# Patient Record
(demographics unavailable — no encounter records)

---

## 2025-05-26 NOTE — END OF VISIT
[FreeTextEntry3] : AMISHA is being assessed for diagnostic clarification of academic and social concerns.  Chief Concerns: - Academic underperformance - Symptoms of inattention and hyperactivity - Social communication difficulties - Executive functioning issues  Early developmental milestones: - Pregnancy was uneventful with no complications - Born via  due to history of older sibling being large - No early intervention referrals - Milestone attainment reported as good  Current developmental milestones: Language : - No early language delays reported - Expressive language appears grossly normal - Issues with comprehension at times - Unclear if comprehension issues are due to attention problems - Can give short answers if not interested in topic - Requires persistent probing by others to engage in conversation - Limited back-and-forth conversation - Topic-specific preferences in conversation - Tends to redirect conversations to preferred topics (e.g., Minecraft)  Socialization : - Has friends, no reported issues with early socialization - Described as having a 'weird' friend group - Appears to be a people-pleaser - Buys things for friends, potentially to excess  Play : - Enjoys Legos - Likes drawing cartoons - Interested in cars - Attempts to build things (e.g., flashlight with Legos)  Fine motor skills : - History of fine motor issues addressed by OT in schoolr  Gross motor skills : - No gross motor or coordination problems reported - Not described as particularly athletic or sporty  Repetitive/restrictive interests: - Gets fixated on certain topics (e.g., Minecraft) - Can redirect conversations back to preferred topics  Sensory difference: - Some food aversions based on texture - No other major sensory issues reported  Academics: - Academic concerns started in first or second grade - Evaluated by CSE due to fine motor issues - Classified as 'Other Health Impairment' and received an IEP - Services included OT (2-3 times per week) and counseling - Reported to have 'high IQ' on initial testing - Declassified after three years of services - Current academic performance below expectations - Struggles with task completion and meeting deadlines - Difficulties with cognitive flexibility and applying rules to new situations  Inattention/hyperactivity: - Symptoms of inattention and hyperactivity present - Forgetful of items (e.g., school bag, shoes) - Often loses items, including expensive electronics - Struggles with preparation and organization - Easily distracted during classroom activities - Difficulty meeting deadlines - Requires someone to sit next to him for homework direction  Medications: - No current medications reported  Anxiety: - Suspected internalization of anxiety - Increased fidgetiness noted recently - Appears affected by academic performance and peer comparisons  General: Diet : - Some pickiness reported - Prefers bread-based foods - No weight loss or gain issues noted  Sleep : - No significant sleep problems reported - Described as restless but not snoring  Exercise/physical use/hobbies: - Plays soccer - Takes trombone lessons - Not particularly athletic or sporty  Medical problems: - Seizures : ? - Previous Workup : CSE evaluation in early elementary school  - Allergies : - History of allergies to eggs, tree nuts, and peanuts (reportedly improved)  - Nathanael strengths : Enjoys drawing cartoons, building with Legos, hands-on activities Previous Evaluations: - CSE evaluation in early elementary school, resulting in IEP classification. Triennial evaluation leading to declassification.   Assessment and Plan: - Rule out Attention-Deficit/Hyperactivity Disorder (ADHD) : Based on reported symptoms of inattention, hyperactivity, and executive functioning difficulties in multiple settings, as well as family history of ADHD in older sibling. - Administer MICHAEL (Comprehensive ADHD Symptom Inventory) to parent - Administer [place] ADHD Diagnostic Rating Scale to teachers - Review previous IEP and assessment reports - Obtain recent report cards and teacher comments # Rule out Autism Spectrum Disorder (ASD) : Based on reported social communication difficulties, fixation on specific topics, and some atypical social behaviors. - Administer Social Responsiveness Scale (SRS) to parent and potentially to a  or extracurricular  - Consider administering Childhood Autism Rating Scale, High Functioning Version (CARS-HF) - Observe social interactions and communication patterns during assessment  # Evaluate for Specific Learning Disorder : Based on reported academic underperformance and possible comprehension difficulties. - Review previous IQ testing results - Administer Wechsler Individual Achievement Test (WIAT) - Consider administering Noe-Jesus Developmental Test of Visual-Motor Integration (Biocepty VMI) - Assess reading skills using appropriate grade-level materials  Anxiousness : Based on suspected internalization of anxiety and increased fidgetiness. - Administer age-appropriate anxiety screening tool  Time spent before the visit (minutes): Time spent face-to-face (minutes): 38 Time spent after visit (minutes): 5 Total time spent not including testing (minutes): 43 Time spent on chart preparing to see the patient (chart review, review of behavior rating scales, school records, psychoactive testing), F2F time, placing appropriate orders for therapeutic interventions and medications, and clinical documentation/coordination of care: 43 minutes   Billing codes summary: 28992 Low 30 min.

## 2025-05-26 NOTE — END OF VISIT
[FreeTextEntry3] : AMISHA is being assessed for diagnostic clarification of academic and social concerns.  Chief Concerns: - Academic underperformance - Symptoms of inattention and hyperactivity - Social communication difficulties - Executive functioning issues  Early developmental milestones: - Pregnancy was uneventful with no complications - Born via  due to history of older sibling being large - No early intervention referrals - Milestone attainment reported as good  Current developmental milestones: Language : - No early language delays reported - Expressive language appears grossly normal - Issues with comprehension at times - Unclear if comprehension issues are due to attention problems - Can give short answers if not interested in topic - Requires persistent probing by others to engage in conversation - Limited back-and-forth conversation - Topic-specific preferences in conversation - Tends to redirect conversations to preferred topics (e.g., Minecraft)  Socialization : - Has friends, no reported issues with early socialization - Described as having a 'weird' friend group - Appears to be a people-pleaser - Buys things for friends, potentially to excess  Play : - Enjoys Legos - Likes drawing cartoons - Interested in cars - Attempts to build things (e.g., flashlight with Legos)  Fine motor skills : - History of fine motor issues addressed by OT in schoolr  Gross motor skills : - No gross motor or coordination problems reported - Not described as particularly athletic or sporty  Repetitive/restrictive interests: - Gets fixated on certain topics (e.g., Minecraft) - Can redirect conversations back to preferred topics  Sensory difference: - Some food aversions based on texture - No other major sensory issues reported  Academics: - Academic concerns started in first or second grade - Evaluated by CSE due to fine motor issues - Classified as 'Other Health Impairment' and received an IEP - Services included OT (2-3 times per week) and counseling - Reported to have 'high IQ' on initial testing - Declassified after three years of services - Current academic performance below expectations - Struggles with task completion and meeting deadlines - Difficulties with cognitive flexibility and applying rules to new situations  Inattention/hyperactivity: - Symptoms of inattention and hyperactivity present - Forgetful of items (e.g., school bag, shoes) - Often loses items, including expensive electronics - Struggles with preparation and organization - Easily distracted during classroom activities - Difficulty meeting deadlines - Requires someone to sit next to him for homework direction  Medications: - No current medications reported  Anxiety: - Suspected internalization of anxiety - Increased fidgetiness noted recently - Appears affected by academic performance and peer comparisons  General: Diet : - Some pickiness reported - Prefers bread-based foods - No weight loss or gain issues noted  Sleep : - No significant sleep problems reported - Described as restless but not snoring  Exercise/physical use/hobbies: - Plays soccer - Takes trombone lessons - Not particularly athletic or sporty  Medical problems: - Seizures : ? - Previous Workup : CSE evaluation in early elementary school  - Allergies : - History of allergies to eggs, tree nuts, and peanuts (reportedly improved)  - Nathanael strengths : Enjoys drawing cartoons, building with Legos, hands-on activities Previous Evaluations: - CSE evaluation in early elementary school, resulting in IEP classification. Triennial evaluation leading to declassification.   Assessment and Plan: - Rule out Attention-Deficit/Hyperactivity Disorder (ADHD) : Based on reported symptoms of inattention, hyperactivity, and executive functioning difficulties in multiple settings, as well as family history of ADHD in older sibling. - Administer MICHAEL (Comprehensive ADHD Symptom Inventory) to parent - Administer [place] ADHD Diagnostic Rating Scale to teachers - Review previous IEP and assessment reports - Obtain recent report cards and teacher comments # Rule out Autism Spectrum Disorder (ASD) : Based on reported social communication difficulties, fixation on specific topics, and some atypical social behaviors. - Administer Social Responsiveness Scale (SRS) to parent and potentially to a  or extracurricular  - Consider administering Childhood Autism Rating Scale, High Functioning Version (CARS-HF) - Observe social interactions and communication patterns during assessment  # Evaluate for Specific Learning Disorder : Based on reported academic underperformance and possible comprehension difficulties. - Review previous IQ testing results - Administer Wechsler Individual Achievement Test (WIAT) - Consider administering Noe-Jessu Developmental Test of Visual-Motor Integration (Spinal Kineticsy VMI) - Assess reading skills using appropriate grade-level materials  Anxiousness : Based on suspected internalization of anxiety and increased fidgetiness. - Administer age-appropriate anxiety screening tool  Time spent before the visit (minutes): Time spent face-to-face (minutes): 38 Time spent after visit (minutes): 5 Total time spent not including testing (minutes): 43 Time spent on chart preparing to see the patient (chart review, review of behavior rating scales, school records, psychoactive testing), F2F time, placing appropriate orders for therapeutic interventions and medications, and clinical documentation/coordination of care: 43 minutes   Billing codes summary: 89322 Low 30 min.

## 2025-06-13 NOTE — END OF VISIT
[Time Spent: ___ minutes] : I have spent [unfilled] minutes of time on the encounter which excludes teaching and separately reported services. [FreeTextEntry3] : I have fully participated in the care of this patient. I have reviewed all the pertinent clinical information, including history, physical exam, plan, and the Fellow's note, and made changes where necessary. I fully participated in a review of any pertinent testing/rating scales,  face to face visit, and note/report writing. I agree with Dr. Miguel Tiwari's assessment and plan as documented in the note above.     I spent approximately  112 minutes on German 10 2025, in the care of this patient including discussion of the case with the fellow and excludes teaching.   See exact breakdown in the billing session   Lance is an 11-year-old boy, who is being evaluated secondary to the following concerns: ADHD and social deficits. Administered ADOS.  I was present for the most of the test. I scored and reviewed recommendations with Dr. Tiwari. ADOS is positive for ASD (moderate); score=7.  Of note,  he did not have many Type B criteria symptoms (has one for excessive video librado/interest).  SRS are significant for SCI in a moderate clinical range for parent and teacher; RBRs are below cutoff.  Assessment and Plan: - Attention Deficit Hyperactivity Disorder (ADHD) : Lance has a previous diagnosis of ADHD. During the evaluation, he demonstrated difficulty staying focused on tasks and reported getting distracted while doing homework. His academic performance is generally good, but he struggles with completing assignments consistently.  - Discuss potential medical treatment - Recommend implementation of organizational strategies, such as using a chart or bertha to track assignments and chores  - has accommodations on his IEP - ? Speech/language assessment, especially pragmatics -> social skills training  - Social Communication Disorder : Lance displays some social deficits, including difficulty understanding social nuances and limited insight into interpersonal relationships. He struggles to articulate his emotions and the reasons behind social interactions like friendship and marriage. - Refer for social skills training or group therapy to improve social understanding and interaction - Suggest school-based interventions to support social integration and peer relationships - Provide resources on social-emotional learning for parents and educators  - Executive Functioning Deficits : Lance demonstrates difficulties with task initiation, organization, and follow-through, as evidenced by his struggles with completing homework and household chores. - Recommend executive functioning coaching or occupational therapy to develop strategies for organization and time management - Suggest implementation of visual schedules and reminders at home and school - Discuss the use of positive reinforcement and reward systems to motivate task completion

## 2025-06-13 NOTE — BIRTH HISTORY
[TextEntry] : Lance was born at term (39 weeks gestation) via  due to history of older sibling being large. There were no complications during pregnancy or delivery, and no NICU stay was required.

## 2025-06-13 NOTE — SOCIAL HISTORY
[Mother] : mother [Father] : father [Brother] : brother [de-identified] : Brother is 2 years older.  [FreeTextEntry2] : , [employer]  [FreeTextEntry3] : Civil   [FreeTextEntry6] : 14-year-old rescue dog named Mae. There are no smokers or firearms in the home.

## 2025-06-13 NOTE — SOCIAL HISTORY
[Mother] : mother [Father] : father [Brother] : brother [de-identified] : Brother is 2 years older.  [FreeTextEntry2] : , [employer]  [FreeTextEntry3] : Civil   [FreeTextEntry6] : 14-year-old rescue dog named Mae. There are no smokers or firearms in the home.

## 2025-06-13 NOTE — HISTORY OF PRESENT ILLNESS
[Just Completed?] : just completed [Public] : Public [Gen Ed: _____] : General Education class [unfilled] [IEP] : Individualized Education Program [OHI] : Other Health Impairment [OT: ____] : Occupational Therapy [unfilled] [TextEntry] : ~ History of Present Illness:  Early Development and Initial Concerns Early developmental milestones were reportedly normal with no early intervention referrals needed. His first issues began when he was enrolled in first and second grade, where teachers recommended an evaluation by the special education committee. He was subsequently granted an IEP under the "Other Health Impairment" classification and received occupational therapy 2-3 times per week along with counseling.  Current Developmental Status and Functioning  Communication Lance is described as shy and can be fixated on specific topics (e.g., Minecraft). His expressive language appears grossly normal with no early language delays reported. However, he demonstrates issues with comprehension at times, though it's unclear if these are due to attention problems. He tends to give short answers when not interested in a topic and requires persistent probing to engage in conversation. His mother reports limited back-and-forth conversation skills, with a tendency to redirect conversations to preferred topics. Despite these challenges, he is able to carry virtual conversations.  Social Interaction Lance has friends, though his mother describes them as a "weird group." He has no reported issues with early socialization. He's described as a people-pleaser who sometimes buys things for friends, potentially to excess. He has historically maintained good eye contact and has been good at picking up emotions from others, though he sometimes has challenges understanding others' perspectives. He understands sarcasm well.  Behavior and Self-Regulation: Lance demonstrates executive functioning difficulties, including problems with organization and preparation. He often forgets items (e.g., school bag, shoes) and has lost expensive electronics in the past. He can be stubborn and oppositional when fixated on certain ideas, showing cognitive inflexibility. For example, when his trombone didn't fit in his locker, he left it on the floor rather than considering taking it to the music room. He's currently using someone else's locker because he doesn't remember his own combination. His mother has noted increased fidgeting recently, possibly related to anxiety. He is not aggressive or physical with others and is generally flexible with changes in plans or schedules.  Academic/School Performance: Academic concerns began in first or second grade, leading to a CSE evaluation due to fine motor issues. He was classified under "Other Health Impairment" and received an IEP with services including OT (2-3 times per week) and counseling. Initial testing reportedly showed a "high IQ," but he was declassified after three years of services. Currently, his academic performance is below expectations. He struggles with task completion, meeting deadlines, cognitive flexibility, and applying rules to new situations. His reading is adequate, but comprehension remains difficult at times, and his handwriting fatigues easily. He requires someone to sit with him during homework for direction and redirection.  Daily Living Skills: Lance's adaptive living skills are age-appropriate; he is described as very independent.  Current Therapies and Interventions: Lance was previously on an IEP but was declassified after three years of services. He is not currently receiving specialized services.  General Sleep: Good sleep habits. Bedtime 9-10 PM, wakes at 7 AM. No snoring reported. Described as restless but not snoring. Diet: Picky eater. Prefers bread-based foods and carbohydrates. No weight loss or gain issues noted. Strengths/Activities/Hobbies: Enjoys drawing cartoons, building with Legos, cars, plays soccer, and takes troTwonq lessons. Not described as particularly athletic.  [FreeTextEntry4] : [facility] [FreeTextEntry1] : in addition, Lance receives behavior consultation services eight (8) hours monthly/2 hours per week. The effect of these interventions has beenpositive in promoting the student's growth and development. [FreeTextEntry5] : Based on 4588-9036 Individualized Education Program (IEP) with parent's report. Most recent document not available.   [TWNoteComboBox1] : 6th Grade

## 2025-06-13 NOTE — SOCIAL HISTORY
[Mother] : mother [Father] : father [Brother] : brother [de-identified] : Brother is 2 years older.  [FreeTextEntry2] : , [employer]  [FreeTextEntry3] : Civil   [FreeTextEntry6] : 14-year-old rescue dog named Mae. There are no smokers or firearms in the home.

## 2025-06-13 NOTE — HISTORY OF PRESENT ILLNESS
[Just Completed?] : just completed [Public] : Public [Gen Ed: _____] : General Education class [unfilled] [IEP] : Individualized Education Program [OHI] : Other Health Impairment [OT: ____] : Occupational Therapy [unfilled] [TextEntry] : ~ History of Present Illness:  Early Development and Initial Concerns Early developmental milestones were reportedly normal with no early intervention referrals needed. His first issues began when he was enrolled in first and second grade, where teachers recommended an evaluation by the special education committee. He was subsequently granted an IEP under the "Other Health Impairment" classification and received occupational therapy 2-3 times per week along with counseling.  Current Developmental Status and Functioning  Communication Lance is described as shy and can be fixated on specific topics (e.g., Minecraft). His expressive language appears grossly normal with no early language delays reported. However, he demonstrates issues with comprehension at times, though it's unclear if these are due to attention problems. He tends to give short answers when not interested in a topic and requires persistent probing to engage in conversation. His mother reports limited back-and-forth conversation skills, with a tendency to redirect conversations to preferred topics. Despite these challenges, he is able to carry virtual conversations.  Social Interaction Lance has friends, though his mother describes them as a "weird group." He has no reported issues with early socialization. He's described as a people-pleaser who sometimes buys things for friends, potentially to excess. He has historically maintained good eye contact and has been good at picking up emotions from others, though he sometimes has challenges understanding others' perspectives. He understands sarcasm well.  Behavior and Self-Regulation: Lance demonstrates executive functioning difficulties, including problems with organization and preparation. He often forgets items (e.g., school bag, shoes) and has lost expensive electronics in the past. He can be stubborn and oppositional when fixated on certain ideas, showing cognitive inflexibility. For example, when his trombone didn't fit in his locker, he left it on the floor rather than considering taking it to the music room. He's currently using someone else's locker because he doesn't remember his own combination. His mother has noted increased fidgeting recently, possibly related to anxiety. He is not aggressive or physical with others and is generally flexible with changes in plans or schedules.  Academic/School Performance: Academic concerns began in first or second grade, leading to a CSE evaluation due to fine motor issues. He was classified under "Other Health Impairment" and received an IEP with services including OT (2-3 times per week) and counseling. Initial testing reportedly showed a "high IQ," but he was declassified after three years of services. Currently, his academic performance is below expectations. He struggles with task completion, meeting deadlines, cognitive flexibility, and applying rules to new situations. His reading is adequate, but comprehension remains difficult at times, and his handwriting fatigues easily. He requires someone to sit with him during homework for direction and redirection.  Daily Living Skills: Lance's adaptive living skills are age-appropriate; he is described as very independent.  Current Therapies and Interventions: Lance was previously on an IEP but was declassified after three years of services. He is not currently receiving specialized services.  General Sleep: Good sleep habits. Bedtime 9-10 PM, wakes at 7 AM. No snoring reported. Described as restless but not snoring. Diet: Picky eater. Prefers bread-based foods and carbohydrates. No weight loss or gain issues noted. Strengths/Activities/Hobbies: Enjoys drawing cartoons, building with Legos, cars, plays soccer, and takes [employer] lessons. Not described as particularly athletic.  [FreeTextEntry4] : [facility] [FreeTextEntry1] : in addition, Lance receives behavior consultation services eight (8) hours monthly/2 hours per week. The effect of these interventions has beenpositive in promoting the student's growth and development. [FreeTextEntry5] : Based on 1126-2173 Individualized Education Program (IEP) with parent's report. Most recent document not available.   [TWNoteComboBox1] : 6th Grade

## 2025-06-13 NOTE — FAMILY HISTORY
[FreeTextEntry1] : Brother (Hernán, 13): Full sibling, has ADHD Paternal uncle: History of learning disabilities No family history of cardiac congenital conditions

## 2025-06-13 NOTE — REASON FOR VISIT
[TextEntry] : AMISHA BLOOD is being seen for a follow-up developmental-behavioral pediatric evaluation for diagnostic assessment of suspected attention deficit hyperactivity disorder (ADHD), social skill delays and academic issues.   AMISHA is unable to provide a comprehensive and factually accurate history due to their age and/or developmental stage. Parent(s) served as independent historian(s) during today's visit.

## 2025-06-13 NOTE — HISTORY OF PRESENT ILLNESS
[Just Completed?] : just completed [Public] : Public [Gen Ed: _____] : General Education class [unfilled] [IEP] : Individualized Education Program [OHI] : Other Health Impairment [OT: ____] : Occupational Therapy [unfilled] [TextEntry] : ~ History of Present Illness:  Early Development and Initial Concerns Early developmental milestones were reportedly normal with no early intervention referrals needed. His first issues began when he was enrolled in first and second grade, where teachers recommended an evaluation by the special education committee. He was subsequently granted an IEP under the "Other Health Impairment" classification and received occupational therapy 2-3 times per week along with counseling.  Current Developmental Status and Functioning  Communication Lance is described as shy and can be fixated on specific topics (e.g., Minecraft). His expressive language appears grossly normal with no early language delays reported. However, he demonstrates issues with comprehension at times, though it's unclear if these are due to attention problems. He tends to give short answers when not interested in a topic and requires persistent probing to engage in conversation. His mother reports limited back-and-forth conversation skills, with a tendency to redirect conversations to preferred topics. Despite these challenges, he is able to carry virtual conversations.  Social Interaction Lance has friends, though his mother describes them as a "weird group." He has no reported issues with early socialization. He's described as a people-pleaser who sometimes buys things for friends, potentially to excess. He has historically maintained good eye contact and has been good at picking up emotions from others, though he sometimes has challenges understanding others' perspectives. He understands sarcasm well.  Behavior and Self-Regulation: Lance demonstrates executive functioning difficulties, including problems with organization and preparation. He often forgets items (e.g., school bag, shoes) and has lost expensive electronics in the past. He can be stubborn and oppositional when fixated on certain ideas, showing cognitive inflexibility. For example, when his trombone didn't fit in his locker, he left it on the floor rather than considering taking it to the music room. He's currently using someone else's locker because he doesn't remember his own combination. His mother has noted increased fidgeting recently, possibly related to anxiety. He is not aggressive or physical with others and is generally flexible with changes in plans or schedules.  Academic/School Performance: Academic concerns began in first or second grade, leading to a CSE evaluation due to fine motor issues. He was classified under "Other Health Impairment" and received an IEP with services including OT (2-3 times per week) and counseling. Initial testing reportedly showed a "high IQ," but he was declassified after three years of services. Currently, his academic performance is below expectations. He struggles with task completion, meeting deadlines, cognitive flexibility, and applying rules to new situations. His reading is adequate, but comprehension remains difficult at times, and his handwriting fatigues easily. He requires someone to sit with him during homework for direction and redirection.  Daily Living Skills: Lance's adaptive living skills are age-appropriate; he is described as very independent.  Current Therapies and Interventions: Lance was previously on an IEP but was declassified after three years of services. He is not currently receiving specialized services.  General Sleep: Good sleep habits. Bedtime 9-10 PM, wakes at 7 AM. No snoring reported. Described as restless but not snoring. Diet: Picky eater. Prefers bread-based foods and carbohydrates. No weight loss or gain issues noted. Strengths/Activities/Hobbies: Enjoys drawing cartoons, building with Legos, cars, plays soccer, and takes [employer] lessons. Not described as particularly athletic.  [FreeTextEntry4] : [facility] [FreeTextEntry1] : in addition, Lance receives behavior consultation services eight (8) hours monthly/2 hours per week. The effect of these interventions has beenpositive in promoting the student's growth and development. [FreeTextEntry5] : Based on [id number] Individualized Education Program (IEP) with parent's report. Most recent document not available.   [TWNoteComboBox1] : 6th Grade

## 2025-07-08 NOTE — REASON FOR VISIT
[TextEntry] : This visit was provided via telehealth using real-time 2-way audio visual technology.   LANCE BLOOD is being seen for a feedback session with the department of developmental and behavioral pediatrics. The providers, Dr. Tiwari, fellow and Dr. Tobar, supervising attending, were at their medical office located in [place], [place], at the time of the visit.   The patient's mother, Mrs. BLOOD was located at their residence in [address] [place] [address]. Who gave verbal consent during this visit, and because LANCE is unable to provide a comprehensive and factually accurate history due to his age and/or developmental stage, also served as independent historian. Per parental request, Lance did not participate in today's visit, but was located in the residence.

## 2025-07-08 NOTE — SOCIAL HISTORY
[Mother] : mother [Father] : father [Brother] : brother [de-identified] : Brother is 2 years older.  [FreeTextEntry2] : , [employer]  [FreeTextEntry6] : 14-year-old rescue dog named Mae. There are no smokers or firearms in the home.  [FreeTextEntry3] : Civil

## 2025-07-08 NOTE — HISTORY OF PRESENT ILLNESS
[Just Completed?] : just completed [Public] : Public [Gen Ed: _____] : General Education class [unfilled] [IEP] : Individualized Education Program [OHI] : Other Health Impairment [OT: ____] : Occupational Therapy [unfilled] [TextEntry] : History of Present Illness:  Early Development and Initial Concerns Early developmental milestones were reportedly normal with no early intervention referrals needed. His first issues began when he was enrolled in first and second grades, where teachers recommended an evaluation by the special education committee. He was subsequently granted an IEP under the "Other Health Impairment" classification and received occupational therapy 2-3 times per week along with counseling.  Current Developmental Status and Functioning  Communication Lance is described as shy and can be fixated on specific topics (e.g., Minecraft). His expressive language appears grossly normal with no early language delays reported. However, he demonstrates issues with comprehension at times, though it's unclear if these are due to attention problems. He tends to give short answers when not interested in a topic and requires persistent probing to engage in conversation. His mother reports limited back-and-forth conversation skills, with a tendency to redirect conversations to preferred topics.   Social Interaction Lance has friends, though his mother describes them as a "weird group." He has no reported issues with early socialization. He's described as a people-pleaser who sometimes buys things for friends, potentially to excess. He has historically maintained good eye contact and has been good at picking up emotions from others, though he sometimes has challenges understanding others' perspectives. He understands sarcasm well.  Behavior and Self-Regulation: Lance demonstrates executive functioning difficulties, including problems with organization and preparation. He often forgets items (e.g., school bag, shoes) and has lost expensive electronics in the past. He can be stubborn and oppositional when fixated on certain ideas, showing cognitive inflexibility. For example, when his trombone didn't fit in his locker, he left it on the floor rather than considering taking it to the music room. He's currently using someone else's locker because he doesn't remember his own combination. His mother has noted increased fidgeting recently, possibly related to anxiety. He is not aggressive or physical with others and is generally flexible with changes in plans or schedules.  Academic/School Performance: Academic concerns began in first or second grade, leading to a CSE evaluation due to fine motor issues. He was classified under "Other Health Impairment" and received an IEP with services including OT (2-3 times per week) and counseling. Initial testing reportedly showed a "high IQ,"; he was declassified after three years of services. Currently, his academic performance is below expectations. He struggles with task completion, meeting deadlines, cognitive flexibility, and applying rules to new situations. His reading is adequate, but comprehension remains difficult at times, and his handwriting fatigues easily. He requires someone to sit with him during homework for direction and redirection.  Daily Living Skills: Lance's adaptive living skills are age-appropriate; he is described as very independent.  Current Therapies and Interventions: Lance was previously on an IEP but was declassified after three years of services. He is not currently receiving specialized services.  General: Sleep: Good sleep habits. Bedtime 9-10 PM, wakes at 7 AM. No snoring reported. Described as restless but not snoring. Diet: Picky eater. Prefers bread-based foods and carbohydrates. No weight loss or gain issues noted. Strengths/Activities/Hobbies: Enjoys drawing cartoons, building with Legos, cars, plays soccer, and takes trombone lessons. Not described as particularly athletic.  [FreeTextEntry4] : [facility] [FreeTextEntry1] : in addition, Lance receives behavior consultation services eight (8) hours monthly/2 hours per week. The effect of these interventions has beenpositive in promoting the student's growth and development. [FreeTextEntry5] : Based on [id number] Individualized Education Program (IEP) with parent's report. Most recent document not available.   [TWNoteComboBox1] : 6th Grade

## 2025-07-08 NOTE — END OF VISIT
[FreeTextEntry3] : I have fully participated in the care of this patient. I have reviewed all the pertinent clinical information, including history, physical exam, plan, and the Fellow's note, and made changes where necessary. I fully participated in a review of any pertinent testing/rating scales,  face to face visit, and note/report writing. I agree with Dr. Miguel Tiwari's assessment and plan as documented in the note above.     I spent approximately 30  minutes on Jul  3 2025  2:30PM in the care of this patient including discussion of the case with the fellow and excludes teaching.   11-year-old male whose parent is given feedback session to discuss results of recent neurodevelopmental assessment and provide accommodations. Per parental request, the patient was not present during the visit. Chief Complaint (CC) : Follow-up for recent neurodevelopmental assessment results HPI: Lance, an 11-year-old male, recently underwent a comprehensive neurodevelopmental assessment. He has been diagnosed with social communication disorder, pragmatics, anxiety, ADHD/executive functioning dysfunction, possible motor coordination disorder, and school underachievement. Today's visit is a feedback session with his mother to discuss the assessment results and provide recommendations for accommodations. Lance has a history of attention difficulties throughout his life, which may have contributed to challenges in social interactions and academic performance. Despite having above-average cognitive abilities (IQ of 116), there is a discrepancy between his potential and actual performance in school. Lance exhibits difficulties in social communication, expressing emotions, and understanding social cues. He tends to provide minimal responses in conversations and may struggle with initiating or maintaining social interactions. The patient previously had an IEP in elementary school but it was discontinued at the end of fifth grade. He currently does not have any formal accommodations in place at school.    Assessment and Plan: # Attention Deficit Hyperactivity Disorder (ADHD), Primarily Inattentive Type : Lance meets criteria for ADHD, primarily inattentive subtype. This diagnosis is consistent with his history of attention difficulties and impacts on academic performance and social interactions. - Refer for further executive functioning assessment to identify specific strengths and weaknesses - Discuss medication options and provide educational resources about ADHD medications - Recommend Cognitive Behavioral Therapy (CBT) to address ADHD symptoms and develop coping strategies - Initiate referral process for CBT through  Health service - Provide parent with educational resources on ADHD management  # Social Pragmatic Communication Disorder : Lance demonstrates difficulties in social communication, understanding social cues, and expressing emotions, consistent with Social Pragmatic Communication Disorder. - Recommend social skills training group or individual therapy focusing on social pragmatics - Explore options for school-based social skills interventions - Provide resources for parent on supporting social communication development at home  -#Anxiety : Lance exhibits symptoms of anxiety, particularly in social situations, which may be exacerbating his social communication difficulties. - Include anxiety management in CBT sessions  # Developmental Coordination Disorder  - Consider accommodations for writing tasks at school  # Learning Difficulties : Despite above-average cognitive abilities, Lance is experiencing academic underachievement, likely related to ADHD and executive functioning challenges. - Initiate process for obtaining a 504 plan at school - Recommend specific accommodations based on executive functioning assessment results   - Follow-up Plan : Comprehensive management and monitoring of Lance's conditions. - Schedule follow-up appointment in December to assess progress and treatment effectiveness - Encourage parent to contact if concerns arise before scheduled follow-up - Prepare and send assessment letter with recommendations for school accommodations - Provide parent with recommended books and resources on ADHD and executive functioning - Encourage parent to discuss potential medication options with Lance - Next appointment to include Lance for direct assessment and input on treatment plan

## 2025-07-08 NOTE — SOCIAL HISTORY
[Mother] : mother [Father] : father [Brother] : brother [de-identified] : Brother is 2 years older.  [FreeTextEntry2] : , [employer]  [FreeTextEntry3] : Civil   [FreeTextEntry6] : 14-year-old rescue dog named Mae. There are no smokers or firearms in the home.

## 2025-07-08 NOTE — PLAN
[TextEntry] : Educational and Psychological Evaluation: - Refer for comprehensive executive functioning assessment. This will help create targeted interventions matching Lance's learning profile  Therapeutic Interventions - Referral placed for Cognitive Behavioral Therapy (CBT), to address social anxiety, develop coping strategies, and improve emotional expression - [facility] referral form to be completed  Educational Supports - Provider to draft letter recommending 504 Plan implementation for upcoming school year - Recommended accommodations to include: executive functioning support, extended time for written tasks, organizational aids, social skills support  - Mother to advocate for social skills group at school ("lunch bunch" as previously attempted)  Social Skills Development - Explore private social skills training groups if school options insufficient  Family Education and Support - Mother to initiate conversation with Lance about ADHD diagnosis this summer - Provider will email educational resources from [employer] - Mother to explore age-appropriate books/materials to help Lance understand ADHD - Emphasize ADHD as "different brain configuration" not deficit  Medication Discussion  - Stimulant trial deferred, collaborative decision made based on today's discussion. - Educational materials about current ADHD medications to be provided - Revisit medication discussion at December follow-up based on school performance  Follow up - December 2025 - [place] teacher and parent follow up forms to be completed prior to next visit

## 2025-07-08 NOTE — REASON FOR VISIT
[TextEntry] : This visit was provided via telehealth using real-time 2-way audio visual technology.   LANCE BLOOD is being seen for a feedback session with the department of developmental and behavioral pediatrics. The providers, Dr. Tiwari, fellow and Dr. Tobar, supervising attending, were at their medical office located in [place], [place], at the time of the visit.   The patient's mother, Mrs. BLOOD was located at their residence in [address] [place] [address]. Who gave verbal consent during this visit, and because LANCE is unable to provide a comprehensive and factually accurate history due to his age and/or developmental stage, also served as independent historian. Per parental request, Lance did not participate in today's visit, but was located in the residence.

## 2025-07-08 NOTE — PLAN
[TextEntry] : Educational and Psychological Evaluation: - Refer for comprehensive executive functioning assessment. This will help create targeted interventions matching Lance's learning profile  Therapeutic Interventions - Referral placed for Cognitive Behavioral Therapy (CBT), to address social anxiety, develop coping strategies, and improve emotional expression - [facility] referral form to be completed  Educational Supports - Provider to draft letter recommending 504 Plan implementation for upcoming school year - Recommended accommodations to include: executive functioning support, extended time for written tasks, organizational aids, social skills support  - Mother to advocate for social skills group at school ("lunch bunch" as previously attempted)  Social Skills Development - Explore private social skills training groups if school options insufficient  Family Education and Support - Mother to initiate conversation with Lance about ADHD diagnosis this summer - Provider will email educational resources from [employer] - Mother to explore age-appropriate books/materials to help Lance understand ADHD - Emphasize ADHD as "different brain configuration" not deficit  Medication Discussion  - Stimulant trial deferred, collaborative decision made based on today's discussion. - Educational materials about current ADHD medications to be provided - Revisit medication discussion at December follow-up based on school performance  Follow up - December 2025 - [employer] teacher and parent follow up forms to be completed prior to next visit

## 2025-07-08 NOTE — HISTORY OF PRESENT ILLNESS
[Just Completed?] : just completed [Public] : Public [Gen Ed: _____] : General Education class [unfilled] [IEP] : Individualized Education Program [OHI] : Other Health Impairment [OT: ____] : Occupational Therapy [unfilled] [TextEntry] : History of Present Illness:  Early Development and Initial Concerns Early developmental milestones were reportedly normal with no early intervention referrals needed. His first issues began when he was enrolled in first and second grades, where teachers recommended an evaluation by the special education committee. He was subsequently granted an IEP under the "Other Health Impairment" classification and received occupational therapy 2-3 times per week along with counseling.  Current Developmental Status and Functioning  Communication Lance is described as shy and can be fixated on specific topics (e.g., Minecraft). His expressive language appears grossly normal with no early language delays reported. However, he demonstrates issues with comprehension at times, though it's unclear if these are due to attention problems. He tends to give short answers when not interested in a topic and requires persistent probing to engage in conversation. His mother reports limited back-and-forth conversation skills, with a tendency to redirect conversations to preferred topics.   Social Interaction Lance has friends, though his mother describes them as a "weird group." He has no reported issues with early socialization. He's described as a people-pleaser who sometimes buys things for friends, potentially to excess. He has historically maintained good eye contact and has been good at picking up emotions from others, though he sometimes has challenges understanding others' perspectives. He understands sarcasm well.  Behavior and Self-Regulation: Lance demonstrates executive functioning difficulties, including problems with organization and preparation. He often forgets items (e.g., school bag, shoes) and has lost expensive electronics in the past. He can be stubborn and oppositional when fixated on certain ideas, showing cognitive inflexibility. For example, when his trombone didn't fit in his locker, he left it on the floor rather than considering taking it to the music room. He's currently using someone else's locker because he doesn't remember his own combination. His mother has noted increased fidgeting recently, possibly related to anxiety. He is not aggressive or physical with others and is generally flexible with changes in plans or schedules.  Academic/School Performance: Academic concerns began in first or second grade, leading to a CSE evaluation due to fine motor issues. He was classified under "Other Health Impairment" and received an IEP with services including OT (2-3 times per week) and counseling. Initial testing reportedly showed a "high IQ,"; he was declassified after three years of services. Currently, his academic performance is below expectations. He struggles with task completion, meeting deadlines, cognitive flexibility, and applying rules to new situations. His reading is adequate, but comprehension remains difficult at times, and his handwriting fatigues easily. He requires someone to sit with him during homework for direction and redirection.  Daily Living Skills: Lance's adaptive living skills are age-appropriate; he is described as very independent.  Current Therapies and Interventions: Lance was previously on an IEP but was declassified after three years of services. He is not currently receiving specialized services.  General: Sleep: Good sleep habits. Bedtime 9-10 PM, wakes at 7 AM. No snoring reported. Described as restless but not snoring. Diet: Picky eater. Prefers bread-based foods and carbohydrates. No weight loss or gain issues noted. Strengths/Activities/Hobbies: Enjoys drawing cartoons, building with Legos, cars, plays soccer, and takes trombone lessons. Not described as particularly athletic.  [FreeTextEntry4] : [facility] [FreeTextEntry1] : in addition, Lance receives behavior consultation services eight (8) hours monthly/2 hours per week. The effect of these interventions has beenpositive in promoting the student's growth and development. [FreeTextEntry5] : Based on [id number] Individualized Education Program (IEP) with parent's report. Most recent document not available.   [TWNoteComboBox1] : 6th Grade